# Patient Record
Sex: MALE | Race: WHITE | Employment: UNEMPLOYED | URBAN - METROPOLITAN AREA
[De-identification: names, ages, dates, MRNs, and addresses within clinical notes are randomized per-mention and may not be internally consistent; named-entity substitution may affect disease eponyms.]

---

## 2018-06-19 ENCOUNTER — HOSPITAL ENCOUNTER (OUTPATIENT)
Dept: GENERAL RADIOLOGY | Age: 73
Discharge: HOME | End: 2018-06-19
Attending: INTERNAL MEDICINE
Payer: MEDICARE

## 2018-06-19 ENCOUNTER — HOSPITAL ENCOUNTER (OUTPATIENT)
Dept: PHYSICAL THERAPY | Age: 73
Discharge: HOME | End: 2018-06-19
Attending: INTERNAL MEDICINE
Payer: MEDICARE

## 2018-06-19 DIAGNOSIS — R13.12 OROPHARYNGEAL DYSPHAGIA: ICD-10-CM

## 2018-06-19 PROCEDURE — G8998 SWALLOW D/C STATUS: HCPCS

## 2018-06-19 PROCEDURE — G8996 SWALLOW CURRENT STATUS: HCPCS

## 2018-06-19 PROCEDURE — 92611 MOTION FLUOROSCOPY/SWALLOW: CPT

## 2018-06-19 PROCEDURE — G8997 SWALLOW GOAL STATUS: HCPCS

## 2018-06-19 PROCEDURE — 74011000255 HC RX REV CODE- 255: Performed by: INTERNAL MEDICINE

## 2018-06-19 PROCEDURE — 74230 X-RAY XM SWLNG FUNCJ C+: CPT

## 2018-06-19 RX ADMIN — BARIUM SULFATE 30 ML: 0.81 POWDER, FOR SUSPENSION ORAL at 10:40

## 2018-06-19 RX ADMIN — BARIUM SULFATE 15 ML: 400 PASTE ORAL at 10:40

## 2018-06-19 NOTE — PROGRESS NOTES
[facility]  Shadi Salazar [address], [address] [place] [address]  Ph: [phone] Fax: [phone]       Outpatient Modified Barium Swallow Evaluation    Patient: Rona Vegas (41 y.o. male)  Date: 6/19/2018  Primary Diagnosis: oropharyngeal dysphagia         Precautions: Fall Risk       Radiologist: Dominick Meier    History: 81year-old man referred for MBS to assess swallow integrity and rule-out aspiration. PMHx: Cerebral Palsy. Currently resides in group home. Caregiver endorses puree/thin liquid diet at baseline; further reports coughing with meals. Video Flouroscopic Procedures  [x] Lateral View   [x] A-P View [] Scanned to level of Sternum    [x] Seated at 90 deg.   [] Other:    Presentation:    [x] Spoon   [x] Cup   [x] Straw   [] Syringe   [] Consecutive Swallows  [] Other:    Consistencies:   [x] Ba+ liquid   [] Ba+ liquid (nectar)   [] Ba+ liquid (honey)   [x] Ba+ puree [x] Ba+ cookie [] 13 mm Ba pill with  Ba wash    Testing Discontinued:   [] Due to:    Treatment Techniques Attempted  [] Head Turn: [] Right [] Left     [] Head Tilt: [] Right [] Left     [] Chin Down:  [] Small Sips/bites:  [x] Effortful swallow:  [x] Double swallow:  [] Other:    Results  Dysphagia Present:     [x] Yes  [] No    Ratings of Dysphagia:     [] Mild  [x] Moderate  [] Severe    Stages of Breakdown:   [x] Oral  [x] Pharyngeal  [] Esophageal    Aspiration:   [] Yes    [x] No  [x] At Risk     Cough: [] Yes      [x] No     Penetration:   [] Yes    [x] No     Cough: [] Yes      [x] No   [] Flash/trace   [] Mod   [] To Chords          Consistency Aspirated:   Consistency Penetrated:   [] Thin Liquid     [] Thin Liquid  [] Nectar-thick Liquid    [] Nectar-thick Liquid   [] Honey-thick Liquid    [] Honey-thick Liquid   [] Puree     [] Puree  [] Solid     [] Solid  [] 13 mm Ba pill with     [] 13 mm Ba pill with      Motility Problems with:  [x] Lip Closure:    [x] Mastication:   [x] Bolus Formation/control:   [x] A-P Transport:  [] Tongue Base Retraction:  [x] Swallow Response (delayed):  [] Velopharyngeal Closure:  [] Pharyngeal Aspirations:  [x] Laryngeal Elevation/adduction:  [x] Epiglottic Inversion:  [] Pharyngeal motility/sensation:  [] Cricopharyngeal Relaxation:  [] Esophageal Peristalsis:  [] Other:    Timing of Aspiration/Penetration:  [] Before Swallow:  [] During Swallow:  [] After Swallow:    Transit Time Delay:  [x] >1 Second  Oral  [x] >1 Second Pharyngeal  [] >20 Second Esophageal     Residuals:  [x] Vallecula:    [] Mild  [x] Mod  [] Severe  [x] Pyriform Sinus:   [] Mild  [x] Mod  [] Severe  [x] Posterior Pharyngeal Wall:  [x] Mild  [] Mod  [] Severe    GCODESwallowing:  Swallow Current Status CJ= 20-39%   Swallow Goal Status CJ= 20-39%   Swallow D/C Status CJ= 20-39%    The severity rating is based on the following outcomes:    National Outcomes Measures (NOMS) - REGINA Noms Swallow Level 5  8-point Penetration-Aspiration Scale - Score 1  Professional Judgement       Videofluoroscopy Results/Recommendations:  MBS completed without aspiration across all study trials, to include: thin liquids via cup sips and pudding. Pt unable to create adequate labial seal for straw use during thin liquid trial. Further, attempted solid trial; however, pt expelled from oral cavity with no attempt to masticate. Pt exhibited consistently delayed A-P transit and premature spillage into valleculae and pyriforms across all study trials. Decreased hyolaryngeal excursion, exacerbated by poor positioning (anterior head posture) resulting in varying levels of residue in valleculae and pyriforms. Pt unable to follow verbal cues for multiple effortful swallow to clear residuals. Pt presents with moderate oropharyngeal dysphagia, as evidenced above, which places pt at risk for aspiration. At this time, safest for puree solid, thin liquid diet ONE SIP AT A TIME.  SLP utilized video of study for visual feedback, education, and recommendations for pt/caregiver; verbalized comprehension. Recommendations:  -Puree/thin liquid diet (no straws)  -May benefit from Provale Cup to monitor sip size  -Meds crushed  -Monitor for all po intake to ensure one bite/sip at a time with multiple swallows after each bite/sip      Thank you for this referral,  Zana Case SLP Student Intern with:  Yariel Hoyos.  [place] [address] [id number] [facility]  Office: [phone]  Pager: [phone]